# Patient Record
Sex: MALE | Race: WHITE | URBAN - METROPOLITAN AREA
[De-identification: names, ages, dates, MRNs, and addresses within clinical notes are randomized per-mention and may not be internally consistent; named-entity substitution may affect disease eponyms.]

---

## 2017-09-13 DIAGNOSIS — R00.2 PALPITATIONS: ICD-10-CM

## 2021-03-21 ENCOUNTER — TELEPHONE (OUTPATIENT)
Dept: NON INVASIVE DIAGNOSTICS | Age: 61
End: 2021-03-21

## 2021-03-21 NOTE — TELEPHONE ENCOUNTER
Good morning Dr. Tere Osgood. Patient went into atrial fibrillation this weekend. He thinks may be related to recent COVID19 vaccination. He is asymptomatic outside of some fatigue. Heart rates on Kardia in 140s. He doesn't have blood pressure cuff and so I didn't advise him to take another dose of nadolol for now. He will call me if something changes. He restarted his xarelto. He will call tomorrow for possible WARREN cardioversion.       Amanda Sahu MD  Cardiac Electrophysiology  [address] [place] [address]  [phone] [facility]